# Patient Record
Sex: FEMALE | Race: OTHER | URBAN - METROPOLITAN AREA
[De-identification: names, ages, dates, MRNs, and addresses within clinical notes are randomized per-mention and may not be internally consistent; named-entity substitution may affect disease eponyms.]

---

## 2017-06-02 ENCOUNTER — OUTPATIENT (OUTPATIENT)
Dept: OUTPATIENT SERVICES | Facility: HOSPITAL | Age: 57
LOS: 1 days | End: 2017-06-02

## 2017-06-19 ENCOUNTER — APPOINTMENT (OUTPATIENT)
Dept: OBGYN | Facility: CLINIC | Age: 57
End: 2017-06-19

## 2017-06-19 VITALS
SYSTOLIC BLOOD PRESSURE: 122 MMHG | WEIGHT: 122 LBS | BODY MASS INDEX: 21.62 KG/M2 | DIASTOLIC BLOOD PRESSURE: 70 MMHG | HEIGHT: 63 IN

## 2017-06-19 DIAGNOSIS — N92.6 IRREGULAR MENSTRUATION, UNSPECIFIED: ICD-10-CM

## 2017-06-19 LAB
BILIRUB UR QL STRIP: NORMAL
COLLECTION METHOD: NORMAL
GLUCOSE UR-MCNC: NORMAL
HCG UR QL: 0.2 EU/DL
HGB UR QL STRIP.AUTO: NORMAL
KETONES UR-MCNC: NORMAL
LEUKOCYTE ESTERASE UR QL STRIP: NORMAL
NITRITE UR QL STRIP: NORMAL
PH UR STRIP: 7
PROT UR STRIP-MCNC: NORMAL
SP GR UR STRIP: 1.01

## 2017-08-01 ENCOUNTER — RESULT REVIEW (OUTPATIENT)
Age: 57
End: 2017-08-01

## 2017-09-18 ENCOUNTER — APPOINTMENT (OUTPATIENT)
Dept: OBGYN | Facility: CLINIC | Age: 57
End: 2017-09-18
Payer: COMMERCIAL

## 2017-09-18 VITALS
WEIGHT: 121 LBS | SYSTOLIC BLOOD PRESSURE: 121 MMHG | HEIGHT: 63 IN | BODY MASS INDEX: 21.44 KG/M2 | DIASTOLIC BLOOD PRESSURE: 72 MMHG

## 2017-09-18 DIAGNOSIS — N64.4 MASTODYNIA: ICD-10-CM

## 2017-09-18 PROCEDURE — 99213 OFFICE O/P EST LOW 20 MIN: CPT

## 2017-09-18 PROCEDURE — 81003 URINALYSIS AUTO W/O SCOPE: CPT | Mod: QW

## 2017-09-19 LAB
BILIRUB UR QL STRIP: NORMAL
COLLECTION METHOD: NORMAL
GLUCOSE UR-MCNC: NORMAL
HCG UR QL: 0.2 EU/DL
HGB UR QL STRIP.AUTO: NORMAL
KETONES UR-MCNC: NORMAL
LEUKOCYTE ESTERASE UR QL STRIP: NORMAL
NITRITE UR QL STRIP: NORMAL
PH UR STRIP: 5
PROT UR STRIP-MCNC: NORMAL
SP GR UR STRIP: 1.02

## 2017-11-15 ENCOUNTER — APPOINTMENT (OUTPATIENT)
Dept: OBGYN | Facility: CLINIC | Age: 57
End: 2017-11-15

## 2018-07-16 ENCOUNTER — APPOINTMENT (OUTPATIENT)
Dept: OBGYN | Facility: CLINIC | Age: 58
End: 2018-07-16
Payer: COMMERCIAL

## 2018-07-16 VITALS
WEIGHT: 120 LBS | SYSTOLIC BLOOD PRESSURE: 119 MMHG | DIASTOLIC BLOOD PRESSURE: 68 MMHG | HEIGHT: 63 IN | BODY MASS INDEX: 21.26 KG/M2

## 2018-07-16 DIAGNOSIS — R31.29 OTHER MICROSCOPIC HEMATURIA: ICD-10-CM

## 2018-07-16 LAB — HEMOCCULT SP1 STL QL: NEGATIVE

## 2018-07-16 PROCEDURE — 82270 OCCULT BLOOD FECES: CPT

## 2018-07-16 PROCEDURE — 81003 URINALYSIS AUTO W/O SCOPE: CPT | Mod: QW

## 2018-07-16 PROCEDURE — 99396 PREV VISIT EST AGE 40-64: CPT

## 2018-07-16 RX ORDER — SIMVASTATIN 20 MG/1
20 TABLET, FILM COATED ORAL
Refills: 0 | Status: ACTIVE | COMMUNITY

## 2018-07-20 LAB
APPEARANCE: CLEAR
BACTERIA UR CULT: NORMAL
BACTERIA: NEGATIVE
BILIRUB UR QL STRIP: NORMAL
BILIRUBIN URINE: NEGATIVE
BLOOD URINE: NEGATIVE
COLLECTION METHOD: NORMAL
COLOR: YELLOW
GLUCOSE QUALITATIVE U: NEGATIVE MG/DL
GLUCOSE UR-MCNC: NORMAL
HCG UR QL: 0.2 EU/DL
HGB UR QL STRIP.AUTO: ABNORMAL
HYALINE CASTS: 2 /LPF
KETONES UR-MCNC: ABNORMAL
KETONES URINE: NEGATIVE
LEUKOCYTE ESTERASE UR QL STRIP: NORMAL
LEUKOCYTE ESTERASE URINE: NEGATIVE
MICROSCOPIC-UA: NORMAL
NITRITE UR QL STRIP: NORMAL
NITRITE URINE: NEGATIVE
PH UR STRIP: 5.5
PH URINE: 5
PROT UR STRIP-MCNC: NORMAL
PROTEIN URINE: NEGATIVE MG/DL
RED BLOOD CELLS URINE: 3 /HPF
SP GR UR STRIP: 1.02
SPECIFIC GRAVITY URINE: 1.03
SQUAMOUS EPITHELIAL CELLS: 1 /HPF
UROBILINOGEN URINE: NEGATIVE MG/DL
WHITE BLOOD CELLS URINE: 1 /HPF

## 2019-07-23 ENCOUNTER — APPOINTMENT (OUTPATIENT)
Dept: OBGYN | Facility: CLINIC | Age: 59
End: 2019-07-23
Payer: COMMERCIAL

## 2019-07-23 VITALS
DIASTOLIC BLOOD PRESSURE: 64 MMHG | WEIGHT: 123 LBS | HEIGHT: 63.5 IN | BODY MASS INDEX: 21.52 KG/M2 | SYSTOLIC BLOOD PRESSURE: 112 MMHG

## 2019-07-23 LAB — HEMOCCULT SP1 STL QL: NEGATIVE

## 2019-07-23 PROCEDURE — 82270 OCCULT BLOOD FECES: CPT

## 2019-07-23 PROCEDURE — 99396 PREV VISIT EST AGE 40-64: CPT

## 2019-07-23 NOTE — PHYSICAL EXAM
[Alert] : alert [Awake] : awake [Soft] : soft [Oriented x3] : oriented to person, place, and time [Normal] : uterus [No Bleeding] : there was no active vaginal bleeding [Uterine Adnexae] : were not tender and not enlarged [Nl Sphincter Tone] : normal sphincter tone [External Hemorrhoid] : an external hemorrhoid [Acute Distress] : no acute distress [Mass] : no breast mass [Nipple Discharge] : no nipple discharge [Axillary LAD] : no axillary lymphadenopathy [Tender] : non tender [Vulvar Atrophy] : vulvar atrophy [Occult Blood] : occult blood test from digital rectal exam was negative

## 2019-07-23 NOTE — HISTORY OF PRESENT ILLNESS
[1 Year Ago] : 1 year ago [Good] : being in good health [Regular Exercise] : regular exercise [Last Mammogram ___] : Last Mammogram was [unfilled] [Last Bone Density ___] : Last bone density studies [unfilled] [Last Colonoscopy ___] : Last colonoscopy [unfilled] [Last Pap ___] : Last cervical pap smear was [unfilled] [Postmenopausal] : is postmenopausal [Night Sweats] : night sweats [Dyspareunia] : dyspareunia [Definite:  ___ (Date)] : the last menstrual period was [unfilled] [Currently In Menopause] : currently in menopause [Experiencing Menopausal Sxs] : experiencing menopausal symptoms [Sexually Active] : is sexually active [Monogamous] : is monogamous [Male ___] : [unfilled] male [Healthy Diet] : not having a healthy diet [Weight Concerns] : no concerns with her weight [Hot Flashes] : no hot flashes [FreeTextEntry8] : STILL KICKING COVERS OFF AT NIGHT [Normal Amount/Duration] : was abnormal [Spotting Between  Menses] : no spotting between menses [Regular Cycle Intervals] : periods have been irregular [de-identified] : DISCUSSED VAGINAL DRYNESS

## 2019-07-23 NOTE — REVIEW OF SYSTEMS
[Feeling Tired] : feeling tired [Sleep Disturbances] : sleep disturbances [Anxiety] : anxiety [Nl] : Integumentary [Fever] : no fever [Chills] : no chills

## 2019-07-25 LAB — HPV HIGH+LOW RISK DNA PNL CVX: NOT DETECTED

## 2019-07-29 LAB — CYTOLOGY CVX/VAG DOC THIN PREP: ABNORMAL

## 2019-10-11 ENCOUNTER — APPOINTMENT (OUTPATIENT)
Dept: RADIOLOGY | Facility: CLINIC | Age: 59
End: 2019-10-11

## 2019-10-11 ENCOUNTER — APPOINTMENT (OUTPATIENT)
Dept: MAMMOGRAPHY | Facility: CLINIC | Age: 59
End: 2019-10-11
Payer: COMMERCIAL

## 2019-10-11 PROCEDURE — 77063 BREAST TOMOSYNTHESIS BI: CPT

## 2019-10-11 PROCEDURE — 77080 DXA BONE DENSITY AXIAL: CPT

## 2019-10-11 PROCEDURE — 77067 SCR MAMMO BI INCL CAD: CPT

## 2020-10-08 ENCOUNTER — APPOINTMENT (OUTPATIENT)
Dept: OBGYN | Facility: CLINIC | Age: 60
End: 2020-10-08
Payer: COMMERCIAL

## 2020-10-08 VITALS
HEIGHT: 63.5 IN | DIASTOLIC BLOOD PRESSURE: 78 MMHG | BODY MASS INDEX: 21.7 KG/M2 | SYSTOLIC BLOOD PRESSURE: 122 MMHG | WEIGHT: 124 LBS

## 2020-10-08 LAB — HEMOCCULT SP1 STL QL: NEGATIVE

## 2020-10-08 PROCEDURE — 82270 OCCULT BLOOD FECES: CPT

## 2020-10-08 PROCEDURE — 99396 PREV VISIT EST AGE 40-64: CPT

## 2020-10-08 NOTE — COUNSELING
[Nutrition/ Exercise/ Weight Management] : nutrition, exercise, weight management [Vitamins/Supplements] : vitamins/supplements [Sunscreen] : sunscreen [Breast Self Exam] : breast self exam [FreeTextEntry2] : CALL FOR ANY POST MENOP BLEEDING   BONE HEALTH MAINTENANCE DISCUSSED

## 2020-10-08 NOTE — REVIEW OF SYSTEMS
[Patient Intake Form Reviewed] : Patient intake form was reviewed [Negative] : Heme/Lymph [Recent Weight Gain (___ Lbs)] : recent [unfilled] ~Ulb weight gain [Breast Pain] : breast pain [Sleep Disturbances] : sleep disturbances [FreeTextEntry8] : VAGINAL ODOR [de-identified] : BREASTS FEEL LUMPY

## 2020-10-08 NOTE — HISTORY OF PRESENT ILLNESS
[Patient reported mammogram was normal] : Patient reported mammogram was normal [Patient reported PAP Smear was normal] : Patient reported PAP Smear was normal [Patient reported bone density results were abnormal] : Patient reported bone density results were abnormal [Patient reported colonoscopy was normal] : Patient reported colonoscopy was normal [TextBox_4] : 59 YO FEMALE,HERE FOR ANNUAL EXAM  PT HAS BEEN WELL THIS PAST YEAR\par HER LAST ANNUAL EXAM WAS:7/23/19 WITH DR GRANADOS\par \par PREGNANCY HISTORY:  TOTAL   3   LIVE BIRTHS:    2  SAB:   1   IAB:\par \par SEXUALLY ACTIVE: \par LENGTH OF TIME IN RELATIONSHIP: TOGETHER 36 YEARS,\par \par MEDICAL HISTORY INCLUDES:HLD\par FAMILY HISTORY IS SIGNIFICANT FOR: BREAST AND OVARIAN CANCER, CVD    PT HAS BEEN TESTED FOR THE BR CA GENE. BUT DID NOT HAVE THE UPDATE.   PT IS AFRAID THAT HER INSURANCE WOULD NOT COVER MY RISK AND SHE WOULD RATHER HAVE INVITAE FOR$250 \par \par LAST VISIT WITH PRIMARY DOCTOR: WITHIN THE YEAR\par LAST BLOOD WORK: FEB 2020\par \par NUTRITIONAL INFO:WELL BALANCED, \par CALCIUM INTAKE:SUPPLEMENTS:  WITH D ,200 TID\par CORRECT USE OF CALCIUM SUPPLEMENTS WAS REVIEWED\par \par EXERCISES: 3 TIMES A WEEK, WALKS DOG DAILY\par  [Mammogramdate] : 10/11/19 DENSE [TextBox_19] : STABLE BENIGN MASS RIGHT 11-12 OCLOCK [PapSmeardate] : 7/23/19 [TextBox_31] : NEG HRHPV [BoneDensityDate] : 10/11/19 [TextBox_37] : MILD PENIA HIP AND FOCAL SPINE  [ColonoscopyDate] : 2016 [TextBox_43] : DUE 2021 [LMPDate] : AUG 2015

## 2020-10-08 NOTE — PLAN
[FreeTextEntry1] : PT HAD BR CA TESTING YEARS AGO, BUT NOT THE ENTIRE PANEL\par SHE WOULD LIKE INVITAE DRAWN TODAY\par \par DISCUSSED ROLE OF VAGINAL MOISTURIZER DURING WEEK AND LUBRICANTS WITH INTERCOURSE.

## 2020-10-23 ENCOUNTER — NON-APPOINTMENT (OUTPATIENT)
Age: 60
End: 2020-10-23

## 2020-11-05 ENCOUNTER — RESULT REVIEW (OUTPATIENT)
Age: 60
End: 2020-11-05

## 2020-11-05 ENCOUNTER — APPOINTMENT (OUTPATIENT)
Dept: ULTRASOUND IMAGING | Facility: CLINIC | Age: 60
End: 2020-11-05

## 2020-11-05 ENCOUNTER — APPOINTMENT (OUTPATIENT)
Dept: MAMMOGRAPHY | Facility: CLINIC | Age: 60
End: 2020-11-05
Payer: COMMERCIAL

## 2020-11-05 PROCEDURE — 77063 BREAST TOMOSYNTHESIS BI: CPT

## 2020-11-05 PROCEDURE — 99072 ADDL SUPL MATRL&STAF TM PHE: CPT

## 2020-11-05 PROCEDURE — 77067 SCR MAMMO BI INCL CAD: CPT

## 2020-11-05 PROCEDURE — 76641 ULTRASOUND BREAST COMPLETE: CPT | Mod: 50

## 2021-11-30 ENCOUNTER — RESULT REVIEW (OUTPATIENT)
Age: 61
End: 2021-11-30

## 2021-11-30 ENCOUNTER — APPOINTMENT (OUTPATIENT)
Dept: ULTRASOUND IMAGING | Facility: CLINIC | Age: 61
End: 2021-11-30
Payer: COMMERCIAL

## 2021-11-30 ENCOUNTER — APPOINTMENT (OUTPATIENT)
Dept: MAMMOGRAPHY | Facility: CLINIC | Age: 61
End: 2021-11-30
Payer: COMMERCIAL

## 2021-11-30 PROCEDURE — 76641 ULTRASOUND BREAST COMPLETE: CPT | Mod: 50

## 2021-11-30 PROCEDURE — 77067 SCR MAMMO BI INCL CAD: CPT

## 2021-11-30 PROCEDURE — 77063 BREAST TOMOSYNTHESIS BI: CPT

## 2022-07-18 ENCOUNTER — APPOINTMENT (OUTPATIENT)
Dept: OBGYN | Facility: CLINIC | Age: 62
End: 2022-07-18

## 2022-07-18 VITALS
WEIGHT: 130 LBS | SYSTOLIC BLOOD PRESSURE: 128 MMHG | HEIGHT: 63.5 IN | DIASTOLIC BLOOD PRESSURE: 70 MMHG | BODY MASS INDEX: 22.75 KG/M2

## 2022-07-18 DIAGNOSIS — N84.1 POLYP OF CERVIX UTERI: ICD-10-CM

## 2022-07-18 DIAGNOSIS — Z13.820 ENCOUNTER FOR SCREENING FOR OSTEOPOROSIS: ICD-10-CM

## 2022-07-18 DIAGNOSIS — Z12.4 ENCOUNTER FOR SCREENING FOR MALIGNANT NEOPLASM OF CERVIX: ICD-10-CM

## 2022-07-18 PROCEDURE — 99396 PREV VISIT EST AGE 40-64: CPT | Mod: 25

## 2022-07-18 PROCEDURE — 57500 BIOPSY OF CERVIX: CPT

## 2022-07-18 NOTE — HISTORY OF PRESENT ILLNESS
[postmenopausal] : postmenopausal [N] : Patient does not use contraception [Y] : Positive pregnancy history [Currently Active] : currently active [Yes] : Patient has concerns regarding sex [Mammogramdate] : 11/30/2021 [TextBox_19] : BR2 [BreastSonogramDate] : 11/30/2021 [TextBox_25] : BR2 [PapSmeardate] : 07/23/2019 [TextBox_31] : ATROPHIC [BoneDensityDate] : 10/11/2019 [TextBox_37] : OSTEOPENIA [HPVDate] : 07/23/2019 [TextBox_78] : NEG [LMPDate] : 08/2015 [PGHxTotal] : 3 [[facility] : 2 [BannerxLiving] : 2 [PGHxABSpont] : 1 [FreeTextEntry1] : VAGINAL DRYNESS [FreeTextEntry2] :

## 2022-07-18 NOTE — PHYSICAL EXAM
[Appropriately responsive] : appropriately responsive [Alert] : alert [No Acute Distress] : no acute distress [Soft] : soft [Non-distended] : non-distended [Non-tender] : non-tender [No HSM] : No HSM [No Lesions] : no lesions [No Mass] : no mass [Oriented x3] : oriented x3 [Examination Of The Breasts] : a normal appearance [No Masses] : no breast masses were palpable [Vulvar Atrophy] : vulvar atrophy [Labia Majora] : normal [Labia Minora] : normal [Normal] : normal [Uterine Adnexae] : normal [No Tenderness] : no tenderness [Nl Sphincter Tone] : normal sphincter tone [Polyp ___ cm] : [unfilled] ~[facility] polyp [FreeTextEntry9] : GUAIAC NEGATIVE

## 2022-07-19 LAB — HPV HIGH+LOW RISK DNA PNL CVX: NOT DETECTED

## 2022-07-25 LAB — CORE LAB BIOPSY: NORMAL

## 2022-07-26 LAB — CYTOLOGY CVX/VAG DOC THIN PREP: ABNORMAL

## 2022-08-03 ENCOUNTER — NON-APPOINTMENT (OUTPATIENT)
Age: 62
End: 2022-08-03

## 2022-12-06 ENCOUNTER — APPOINTMENT (OUTPATIENT)
Dept: RADIOLOGY | Facility: CLINIC | Age: 62
End: 2022-12-06

## 2022-12-06 ENCOUNTER — APPOINTMENT (OUTPATIENT)
Dept: ULTRASOUND IMAGING | Facility: CLINIC | Age: 62
End: 2022-12-06

## 2022-12-06 ENCOUNTER — RESULT REVIEW (OUTPATIENT)
Age: 62
End: 2022-12-06

## 2022-12-06 ENCOUNTER — APPOINTMENT (OUTPATIENT)
Dept: MAMMOGRAPHY | Facility: CLINIC | Age: 62
End: 2022-12-06

## 2022-12-06 PROCEDURE — 77080 DXA BONE DENSITY AXIAL: CPT

## 2022-12-06 PROCEDURE — 76641 ULTRASOUND BREAST COMPLETE: CPT | Mod: 50

## 2022-12-06 PROCEDURE — 77063 BREAST TOMOSYNTHESIS BI: CPT

## 2022-12-06 PROCEDURE — 77067 SCR MAMMO BI INCL CAD: CPT

## 2024-01-30 ENCOUNTER — RESULT REVIEW (OUTPATIENT)
Age: 64
End: 2024-01-30

## 2024-01-30 ENCOUNTER — APPOINTMENT (OUTPATIENT)
Dept: MAMMOGRAPHY | Facility: CLINIC | Age: 64
End: 2024-01-30
Payer: COMMERCIAL

## 2024-01-30 ENCOUNTER — APPOINTMENT (OUTPATIENT)
Dept: ULTRASOUND IMAGING | Facility: CLINIC | Age: 64
End: 2024-01-30
Payer: COMMERCIAL

## 2024-01-30 PROCEDURE — 76641 ULTRASOUND BREAST COMPLETE: CPT | Mod: 50

## 2024-01-30 PROCEDURE — 77063 BREAST TOMOSYNTHESIS BI: CPT

## 2024-01-30 PROCEDURE — 77067 SCR MAMMO BI INCL CAD: CPT

## 2024-02-27 ENCOUNTER — APPOINTMENT (OUTPATIENT)
Dept: OBGYN | Facility: CLINIC | Age: 64
End: 2024-02-27
Payer: COMMERCIAL

## 2024-02-27 VITALS
HEIGHT: 63.5 IN | SYSTOLIC BLOOD PRESSURE: 120 MMHG | WEIGHT: 125 LBS | BODY MASS INDEX: 21.87 KG/M2 | DIASTOLIC BLOOD PRESSURE: 68 MMHG

## 2024-02-27 DIAGNOSIS — Z01.419 ENCOUNTER FOR GYNECOLOGICAL EXAMINATION (GENERAL) (ROUTINE) W/OUT ABNORMAL FINDINGS: ICD-10-CM

## 2024-02-27 DIAGNOSIS — Z12.11 ENCOUNTER FOR SCREENING FOR MALIGNANT NEOPLASM OF COLON: ICD-10-CM

## 2024-02-27 DIAGNOSIS — R92.30 DENSE BREASTS, UNSPECIFIED: ICD-10-CM

## 2024-02-27 DIAGNOSIS — Z12.39 ENCOUNTER FOR OTHER SCREENING FOR MALIGNANT NEOPLASM OF BREAST: ICD-10-CM

## 2024-02-27 PROCEDURE — 99396 PREV VISIT EST AGE 40-64: CPT

## 2024-02-27 NOTE — HISTORY OF PRESENT ILLNESS
[TextBox_4] : ANNUAL [TextBox_19] : BR 2 [Mammogramdate] : 1/31/24 [TextBox_25] : BR 2 [PapSmeardate] : 7/18/22 [TextBox_31] : ATROPHIC  [BoneDensityDate] : 12/6/22 [TextBox_37] : OSTEOPENIA  [ColonoscopyDate] : 3 YRS  [LMPDate] : 2015 [TextBox_6] : 2015 [Yes] : Patient has concerns regarding sex [Currently Active] : currently active [Men] : men [Vaginal] : vaginal [No] : No [FreeTextEntry1] : VAGINAL DRYNESS [FreeTextEntry2] :

## 2024-02-27 NOTE — PHYSICAL EXAM
[Appropriately responsive] : appropriately responsive [Alert] : alert [No Acute Distress] : no acute distress [Soft] : soft [Non-tender] : non-tender [No HSM] : No HSM [Non-distended] : non-distended [No Lesions] : no lesions [No Mass] : no mass [Oriented x3] : oriented x3 [Examination Of The Breasts] : a normal appearance [No Masses] : no breast masses were palpable [Vulvar Atrophy] : vulvar atrophy [Labia Majora] : normal [Labia Minora] : normal [Polyp ___ cm] : [unfilled] ~[facility] polyp [Normal] : normal [Uterine Adnexae] : non-palpable [No Tenderness] : no tenderness [Nl Sphincter Tone] : normal sphincter tone [FreeTextEntry9] : GUAIAC NEGATIVE

## 2025-03-06 ENCOUNTER — RESULT REVIEW (OUTPATIENT)
Age: 65
End: 2025-03-06

## 2025-03-06 ENCOUNTER — APPOINTMENT (OUTPATIENT)
Dept: MAMMOGRAPHY | Facility: CLINIC | Age: 65
End: 2025-03-06
Payer: COMMERCIAL

## 2025-03-06 ENCOUNTER — APPOINTMENT (OUTPATIENT)
Dept: ULTRASOUND IMAGING | Facility: CLINIC | Age: 65
End: 2025-03-06
Payer: COMMERCIAL

## 2025-03-06 PROCEDURE — 77063 BREAST TOMOSYNTHESIS BI: CPT

## 2025-03-06 PROCEDURE — 77067 SCR MAMMO BI INCL CAD: CPT

## 2025-03-06 PROCEDURE — 76641 ULTRASOUND BREAST COMPLETE: CPT | Mod: 50

## 2025-05-13 ENCOUNTER — NON-APPOINTMENT (OUTPATIENT)
Age: 65
End: 2025-05-13

## 2025-05-14 ENCOUNTER — APPOINTMENT (OUTPATIENT)
Dept: OBGYN | Facility: CLINIC | Age: 65
End: 2025-05-14
Payer: COMMERCIAL

## 2025-05-14 VITALS
HEIGHT: 63.5 IN | SYSTOLIC BLOOD PRESSURE: 112 MMHG | WEIGHT: 129.5 LBS | BODY MASS INDEX: 22.66 KG/M2 | DIASTOLIC BLOOD PRESSURE: 64 MMHG

## 2025-05-14 DIAGNOSIS — R92.30 DENSE BREASTS, UNSPECIFIED: ICD-10-CM

## 2025-05-14 DIAGNOSIS — Z12.4 ENCOUNTER FOR SCREENING FOR MALIGNANT NEOPLASM OF CERVIX: ICD-10-CM

## 2025-05-14 DIAGNOSIS — Z13.820 ENCOUNTER FOR SCREENING FOR OSTEOPOROSIS: ICD-10-CM

## 2025-05-14 DIAGNOSIS — Z12.39 ENCOUNTER FOR OTHER SCREENING FOR MALIGNANT NEOPLASM OF BREAST: ICD-10-CM

## 2025-05-14 DIAGNOSIS — Z01.419 ENCOUNTER FOR GYNECOLOGICAL EXAMINATION (GENERAL) (ROUTINE) W/OUT ABNORMAL FINDINGS: ICD-10-CM

## 2025-05-14 PROCEDURE — 99459 PELVIC EXAMINATION: CPT

## 2025-05-14 PROCEDURE — 99397 PER PM REEVAL EST PAT 65+ YR: CPT

## 2025-05-14 RX ORDER — MELATONIN 3 MG
TABLET ORAL
Refills: 0 | Status: ACTIVE | COMMUNITY

## 2025-05-17 LAB — HPV HIGH+LOW RISK DNA PNL CVX: NOT DETECTED

## 2025-05-21 LAB — CYTOLOGY CVX/VAG DOC THIN PREP: ABNORMAL
